# Patient Record
Sex: MALE | Race: WHITE | URBAN - METROPOLITAN AREA
[De-identification: names, ages, dates, MRNs, and addresses within clinical notes are randomized per-mention and may not be internally consistent; named-entity substitution may affect disease eponyms.]

---

## 2018-05-18 ENCOUNTER — OFFICE VISIT (OUTPATIENT)
Dept: OTOLARYNGOLOGY | Facility: CLINIC | Age: 68
End: 2018-05-18

## 2018-05-18 VITALS
WEIGHT: 170 LBS | BODY MASS INDEX: 26.68 KG/M2 | SYSTOLIC BLOOD PRESSURE: 138 MMHG | TEMPERATURE: 98 F | HEIGHT: 67 IN | DIASTOLIC BLOOD PRESSURE: 86 MMHG

## 2018-05-18 DIAGNOSIS — K14.8 LESION OF TONGUE: Primary | ICD-10-CM

## 2018-05-18 PROCEDURE — 99204 OFFICE O/P NEW MOD 45 MIN: CPT | Performed by: OTOLARYNGOLOGY

## 2018-05-18 PROCEDURE — G0463 HOSPITAL OUTPT CLINIC VISIT: HCPCS | Performed by: OTOLARYNGOLOGY

## 2018-05-18 RX ORDER — ROSUVASTATIN CALCIUM 5 MG/1
TABLET, COATED ORAL
COMMUNITY
Start: 2018-02-28

## 2018-05-18 RX ORDER — TAMSULOSIN HYDROCHLORIDE 0.4 MG/1
CAPSULE ORAL
COMMUNITY
Start: 2018-03-24

## 2018-05-18 RX ORDER — LORAZEPAM 0.5 MG/1
0.5 TABLET ORAL EVERY 4 HOURS PRN
COMMUNITY

## 2018-05-18 RX ORDER — LISINOPRIL 40 MG/1
40 TABLET ORAL DAILY
COMMUNITY

## 2018-05-18 RX ORDER — DULOXETIN HYDROCHLORIDE 60 MG/1
CAPSULE, DELAYED RELEASE ORAL
COMMUNITY
Start: 2018-02-28

## 2018-05-18 NOTE — PROGRESS NOTES
Marquis Alford is a 80year old male. Patient presents with:  Lesion: top of tongue for 10 days after pt bit his tongue       HISTORY OF PRESENT ILLNESS  He presents with a previous history of biting his tongue sometime ago.   This bled significantly and Tremors. Psych Negative Anxiety and depression. Integumentary Negative Frequent skin infections, pigment change and rash. Hema/Lymph Negative Easy bleeding and easy bruising.            PHYSICAL EXAM    /86 (BP Location: Right arm, Patient Posit Rfl:   •  lisinopril 40 MG Oral Tab, Take 40 mg by mouth daily. , Disp: , Rfl:   •  LORazepam 0.5 MG Oral Tab, Take 0.5 mg by mouth every 4 (four) hours as needed for Anxiety. , Disp: , Rfl:   ASSESSMENT AND PLAN    1.  Lesion of tongue  He has a chronic elev

## (undated) NOTE — LETTER
Moishe N[place]otte, [address] [facility]  [address] [facility], [address]        05/18/18        Patient: Roberth Bowser   YOB: 1950   Date of Visit: 5/18/2018       Dear  Dr. Rhett Santos, APN,      Thank you for referring Chela Ordaz